# Patient Record
(demographics unavailable — no encounter records)

---

## 2025-02-28 NOTE — PHYSICAL EXAM
[No Acute Distress] : no acute distress [Well-Appearing] : well-appearing [Normal Sclera/Conjunctiva] : normal sclera/conjunctiva [Normal Outer Ear/Nose] : the outer ears and nose were normal in appearance [No JVD] : no jugular venous distention [No Respiratory Distress] : no respiratory distress  [Normal Rate] : normal rate  [No Edema] : there was no peripheral edema

## 2025-03-03 NOTE — ASSESSMENT
[FreeTextEntry1] : 67 y/o F w/ PMHx of PRASHANT+ arthralgia, asthma, EV (on home BiPAP, inconsistent use), pre-diabetes and dyslipidemia presents for follow-up.  #HTN - BP this visit: 124/88 - was given chlorthalidone 25 mg QD - pt was not taking this medication as she did not know she was supposed to be taking this medication, chlorthalidone not continued for now - C/w lisinopril 5mg daily  #Dyslipidemia - Lipid profile (5/2024): CHOL. 200, ,  (5/2024) - C/w pravastatin 40 mg qhs  #Pre-DM - A1c 6.1% (5/2024) - Encouraged lifestyle modification (CC diet + exercise)  #Vitamin D deficiency #Osteopenia on DEXA Scan 12/23 - C/w calcium and vitamin D - Repeat vitamin D level  #Central retinal vein occlusion - Follows w/ ophthalmology - Hematology eval (3/29)- most likely reason is combination of HTN and hyperlipidemia. Hypercoag workup has been negative  #Global transient amnesia - Reports forgetting all events that happened in a day a few days ago - Concern for TIA - CTH (1/2025): No acute infarct, intracranial hemorrhage, midline shift. - Seen by neuro: Ordered MRI brain with MRA wo contrast, routine EEG; recommended treatment for depressive symptoms however patient refused meds  #Intermittent R ear hearing loss - ENT referral given last visit  #Chronic Osteoarthritis Arthritis - Seen by rheum, -ve extensive serology w/u - Taking OTC Tylenol and diclofenac gel - S/p PT  #HCM - Last DEXA (2/2023): Osteopenia of the spine & L fem neck; declined repeat as she is busy taking care of her family member - Last mammo (10/2023): BI-RADS Cat 2; declined repeat as she is busy taking care of her family member - Last PAP/HPV (7/2022): NILM, HPV -ve; repeat in 7/2027 - Last colonoscopy in 2015, WNL --> repeat in 10 years; declined repeat as she is busy taking care of her family member - Routine bloodwork prior to next visit  #RTC in 6 months or PRN

## 2025-03-03 NOTE — HISTORY OF PRESENT ILLNESS
[FreeTextEntry1] : Follow-up [de-identified] : 67 y/o F w/ PMHx of PRASHANT+ arthralgia, asthma, EV (on home BiPAP, inconsistent use), pre-diabetes and dyslipidemia presents for follow-up. No concerns or complaints

## 2025-03-03 NOTE — HISTORY OF PRESENT ILLNESS
[FreeTextEntry1] : Follow-up [de-identified] : 67 y/o F w/ PMHx of PRASHANT+ arthralgia, asthma, EV (on home BiPAP, inconsistent use), pre-diabetes and dyslipidemia presents for follow-up. No concerns or complaints

## 2025-03-03 NOTE — ASSESSMENT
[FreeTextEntry1] : 65 y/o F w/ PMHx of PRASHANT+ arthralgia, asthma, EV (on home BiPAP, inconsistent use), pre-diabetes and dyslipidemia presents for follow-up.  #HTN - BP this visit: 124/88 - was given chlorthalidone 25 mg QD - pt was not taking this medication as she did not know she was supposed to be taking this medication, chlorthalidone not continued for now - C/w lisinopril 5mg daily  #Dyslipidemia - Lipid profile (5/2024): CHOL. 200, ,  (5/2024) - C/w pravastatin 40 mg qhs  #Pre-DM - A1c 6.1% (5/2024) - Encouraged lifestyle modification (CC diet + exercise)  #Vitamin D deficiency #Osteopenia on DEXA Scan 12/23 - C/w calcium and vitamin D - Repeat vitamin D level  #Central retinal vein occlusion - Follows w/ ophthalmology - Hematology eval (3/29)- most likely reason is combination of HTN and hyperlipidemia. Hypercoag workup has been negative  #Global transient amnesia - Reports forgetting all events that happened in a day a few days ago - Concern for TIA - CTH (1/2025): No acute infarct, intracranial hemorrhage, midline shift. - Seen by neuro: Ordered MRI brain with MRA wo contrast, routine EEG; recommended treatment for depressive symptoms however patient refused meds  #Intermittent R ear hearing loss - ENT referral given last visit  #Chronic Osteoarthritis Arthritis - Seen by rheum, -ve extensive serology w/u - Taking OTC Tylenol and diclofenac gel - S/p PT  #HCM - Last DEXA (2/2023): Osteopenia of the spine & L fem neck; declined repeat as she is busy taking care of her family member - Last mammo (10/2023): BI-RADS Cat 2; declined repeat as she is busy taking care of her family member - Last PAP/HPV (7/2022): NILM, HPV -ve; repeat in 7/2027 - Last colonoscopy in 2015, WNL --> repeat in 10 years; declined repeat as she is busy taking care of her family member - Routine bloodwork prior to next visit  #RTC in 6 months or PRN